# Patient Record
Sex: FEMALE | Race: WHITE | Employment: FULL TIME | URBAN - METROPOLITAN AREA
[De-identification: names, ages, dates, MRNs, and addresses within clinical notes are randomized per-mention and may not be internally consistent; named-entity substitution may affect disease eponyms.]

---

## 2018-12-25 ENCOUNTER — APPOINTMENT (OUTPATIENT)
Dept: CT IMAGING | Age: 43
End: 2018-12-25
Attending: EMERGENCY MEDICINE
Payer: SELF-PAY

## 2018-12-25 ENCOUNTER — HOSPITAL ENCOUNTER (EMERGENCY)
Age: 43
Discharge: HOME | End: 2018-12-26
Attending: EMERGENCY MEDICINE
Payer: SELF-PAY

## 2018-12-25 VITALS
WEIGHT: 215 LBS | HEIGHT: 66 IN | RESPIRATION RATE: 20 BRPM | OXYGEN SATURATION: 98 % | TEMPERATURE: 98.2 F | BODY MASS INDEX: 34.55 KG/M2 | SYSTOLIC BLOOD PRESSURE: 191 MMHG | HEART RATE: 82 BPM | DIASTOLIC BLOOD PRESSURE: 102 MMHG

## 2018-12-25 DIAGNOSIS — S00.83XA CONTUSION OF FACE, INITIAL ENCOUNTER: ICD-10-CM

## 2018-12-25 DIAGNOSIS — S02.2XXA CLOSED FRACTURE OF NASAL BONE, INITIAL ENCOUNTER: ICD-10-CM

## 2018-12-25 DIAGNOSIS — S09.90XA INJURY OF HEAD, INITIAL ENCOUNTER: Primary | ICD-10-CM

## 2018-12-25 DIAGNOSIS — I10 ESSENTIAL HYPERTENSION: ICD-10-CM

## 2018-12-25 DIAGNOSIS — Z91.14 NONCOMPLIANCE WITH MEDICATION REGIMEN: ICD-10-CM

## 2018-12-25 PROCEDURE — 70486 CT MAXILLOFACIAL W/O DYE: CPT

## 2018-12-25 PROCEDURE — 99283 EMERGENCY DEPT VISIT LOW MDM: CPT

## 2018-12-25 PROCEDURE — 70450 CT HEAD/BRAIN W/O DYE: CPT

## 2018-12-25 PROCEDURE — 74011250637 HC RX REV CODE- 250/637: Performed by: EMERGENCY MEDICINE

## 2018-12-25 RX ORDER — CLONIDINE HYDROCHLORIDE 0.1 MG/1
0.2 TABLET ORAL
Status: COMPLETED | OUTPATIENT
Start: 2018-12-25 | End: 2018-12-25

## 2018-12-25 RX ORDER — NAPROXEN 500 MG/1
500 TABLET ORAL 2 TIMES DAILY WITH MEALS
Qty: 20 TAB | Refills: 0 | Status: SHIPPED | OUTPATIENT
Start: 2018-12-25 | End: 2019-01-06

## 2018-12-25 RX ORDER — HYDROCODONE BITARTRATE AND ACETAMINOPHEN 5; 325 MG/1; MG/1
1 TABLET ORAL
Status: COMPLETED | OUTPATIENT
Start: 2018-12-25 | End: 2018-12-25

## 2018-12-25 RX ORDER — HYDROCODONE BITARTRATE AND ACETAMINOPHEN 5; 325 MG/1; MG/1
1 TABLET ORAL
Qty: 10 TAB | Refills: 0 | Status: SHIPPED | OUTPATIENT
Start: 2018-12-25 | End: 2019-01-06

## 2018-12-25 RX ADMIN — HYDROCODONE BITARTRATE AND ACETAMINOPHEN 1 TABLET: 5; 325 TABLET ORAL at 23:51

## 2018-12-25 RX ADMIN — CLONIDINE HYDROCHLORIDE 0.2 MG: 0.1 TABLET ORAL at 23:51
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
## 2018-12-26 NOTE — DISCHARGE INSTRUCTIONS
Broken Nose: Care Instructions  Your Care Instructions    A broken nose is a break, or fracture, of the bone or cartilage. Most broken noses need only home care and a follow-up visit with a doctor. The swelling should go down in a few days. Bruises around your eyes and nose should go away in 2 to 3 weeks. You heal best when you take good care of yourself. Eat a variety of healthy foods, and don't smoke. Follow-up care is a key part of your treatment and safety. Be sure to make and go to all appointments, and call your doctor if you are having problems. It's also a good idea to know your test results and keep a list of the medicines you take. How can you care for yourself at home? · If you have a nasal splint or packing, leave it in place until a doctor removes it. · If your doctor prescribed antibiotics, take them as directed. Do not stop taking them just because you feel better. You need to take the full course of antibiotics. · Take decongestants as directed to help you breathe after the splint or packing is removed. Your doctor may give you a prescription or suggest over-the-counter medicine. · Be safe with medicines. Take pain medicines exactly as directed. ? If the doctor gave you a prescription medicine for pain, take it as prescribed. ? If you are not taking a prescription pain medicine, ask your doctor if you can take an over-the-counter medicine. · Put ice or a cold pack on your nose for 10 to 20 minutes at a time. Try to do this every 1 to 2 hours for the first 3 days (when you are awake) or until the swelling goes down. Put a thin cloth between the ice pack and your skin. · Sleep with your head slightly raised until the swelling goes down. Prop up your head and shoulders on pillows. · Do not play contact sports for 6 weeks. When should you call for help? Call 911 anytime you think you may need emergency care.  For example, call if:    · You have trouble breathing.     · You passed out (lost consciousness).    Call your doctor now or seek immediate medical care if:    · You have signs of infection, such as:  ? Increased pain, swelling, warmth, or redness. ? Red streaks leading from the area. ? Pus draining from the area. ? A fever.     · You have clear fluid draining from your nose.     · You have vision changes.     · Your nose is bleeding.     · You have new or worse pain.    Watch closely for changes in your health, and be sure to contact your doctor if:    · You do not get better as expected. Where can you learn more? Go to http://nikTropical Skoopsmilly.info/. Enter Y345 in the search box to learn more about \"Broken Nose: Care Instructions. \"  Current as of: November 29, 2017  Content Version: 11.8  © 5769-8619 [employer]. Care instructions adapted under license by [employer] (which disclaims liability or warranty for this information). If you have questions about a medical condition or this instruction, always ask your healthcare professional. [place] [address] any warranty or liability for your use of this information. Head Injury: Care Instructions  Your Care Instructions    Most injuries to the head are minor. Bumps, cuts, and scrapes on the head and face usually heal well and can be treated the same as injuries to other parts of the body. Although it's rare, once in a while a more serious problem shows up after you are home. So it's good to be on the lookout for symptoms for a day or two. Follow-up care is a key part of your treatment and safety. Be sure to make and go to all appointments, and call your doctor if you are having problems. It's also a good idea to know your test results and keep a list of the medicines you take. How can you care for yourself at home? · Follow your doctor's instructions. He or she will tell you if you need someone to watch you closely for the next 24 hours or longer.   · Take it easy for the next few days or more if you are not feeling well. · Ask your doctor when it's okay for you to go back to activities like driving a car, riding a bike, or operating machinery. When should you call for help? Call 911 anytime you think you may need emergency care. For example, call if:    · You have a seizure.     · You passed out (lost consciousness).     · You are confused or can't stay awake.    Call your doctor now or seek immediate medical care if:    · You have new or worse vomiting.     · You feel less alert.     · You have new weakness or numbness in any part of your body.    Watch closely for changes in your health, and be sure to contact your doctor if:    · You do not get better as expected.     · You have new symptoms, such as headaches, trouble concentrating, or changes in mood. Where can you learn more? Go to http://nikBiometryCloudmilly.info/. Enter P701 in the search box to learn more about \"Head Injury: Care Instructions. \"  Current as of: June 4, 2018  Content Version: 11.8  © 0075-2191 [employer]. Care instructions adapted under license by [employer] (which disclaims liability or warranty for this information). If you have questions about a medical condition or this instruction, always ask your healthcare professional. [address] any warranty or liability for your use of this information. Bruised Face: Care Instructions  Your Care Instructions  You can get a bruise on your face if you fall or if something hits you in the face. The medical term for a bruise is \"contusion. \" Small blood vessels get torn and leak blood under the skin. Most people think of a bruise as a black-and-blue spot. But bones and muscles can also get bruised. This may damage deep tissues but not cause a bruise you can see. Your doctor will examine you and will gently press on your face to find areas that are tender.  He or she will check your eyes, how well you can move face muscles near the bruise, and your feeling around the area to make sure there isn't a more serious injury, such as a broken bone or nerve damage. You may have tests, including X-rays or other imaging tests like a CT scan. Bruises may cause pain and swelling. But if there is no other damage, they will usually get better in a few weeks with home treatment. Follow-up care is a key part of your treatment and safety. Be sure to make and go to all appointments, and call your doctor if you are having problems. It's also a good idea to know your test results and keep a list of the medicines you take. How can you care for yourself at home? · Put ice or a cold pack on your face for 10 to 20 minutes at a time. Put a thin cloth between the ice and your skin. Try to do this every 1 to 2 hours for the first 3 days (when you are awake) or until the swelling goes down. · Sleep with your head slightly raised until the swelling goes down. Prop up your head and shoulders on pillows. · If you play contact sports, ask your doctor when it's okay to play again. It's safest to wait at least 6 weeks. · Be safe with medicines. Read and follow all instructions on the label. ? If the doctor gave you a prescription medicine for pain, take it as prescribed. ? If you are not taking a prescription pain medicine, ask your doctor if you can take an over-the-counter medicine. When should you call for help? Call your doctor now or seek immediate medical care if:    · Your pain gets worse.     · You have new or worse swelling.     · You have new or worse bleeding.     · The area near the bruise is tingly, weak, or numb.     · You have vision changes.    Watch closely for changes in your health, and be sure to contact your doctor if:    · You do not get better as expected. Where can you learn more? Go to http://nik-milly.info/.   Enter T266 in the search box to learn more about \"Bruised Face: Care Instructions. \"  Current as of: November 20, 2017  Content Version: 11.8  © 0864-6282 [employer]. Care instructions adapted under license by [employer] (which disclaims liability or warranty for this information). If you have questions about a medical condition or this instruction, always ask your healthcare professional. [place] [address] any warranty or liability for your use of this information.
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
## 2018-12-26 NOTE — ED NOTES
Dr Vanessa Helms instructed patient to f/u with PCP concerning elevated BP. Discharge note: The patient was discharged home in stable condition, accompanied by friend. The patient is alert and oriented, is in no respiratory distress and has vital signs noted. The patient's diagnosis, condition and treatment were explained to patient by Dr Vanessa Helms. The patient party expressed understanding of discharge instructions, prescriptions, and plan of care. A discharge plan has been developed. A  was not involved in the process. Patient offered a wheelchair to ED lobby for discharge but declined at this time. Patient ambulated with a steady gate to ED lobby to go home with friend.
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
## 2018-12-26 NOTE — ED NOTES
Dr Сергей Verma made aware of elevated /108. Pt stated she has been out of her BP medication for over a week. This is a change from her triage assessment.
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
## 2019-01-06 ENCOUNTER — HOSPITAL ENCOUNTER (EMERGENCY)
Age: 44
Discharge: HOME | End: 2019-01-06
Attending: STUDENT IN AN ORGANIZED HEALTH CARE EDUCATION/TRAINING PROGRAM
Payer: SELF-PAY

## 2019-01-06 VITALS
OXYGEN SATURATION: 100 % | WEIGHT: 219.36 LBS | DIASTOLIC BLOOD PRESSURE: 91 MMHG | TEMPERATURE: 98.7 F | BODY MASS INDEX: 35.41 KG/M2 | SYSTOLIC BLOOD PRESSURE: 165 MMHG | RESPIRATION RATE: 16 BRPM | HEART RATE: 90 BPM

## 2019-01-06 DIAGNOSIS — S66.911A HAND STRAIN, RIGHT, INITIAL ENCOUNTER: ICD-10-CM

## 2019-01-06 DIAGNOSIS — L08.9 INFECTED SEBACEOUS CYST OF SKIN: Primary | ICD-10-CM

## 2019-01-06 DIAGNOSIS — L72.3 INFECTED SEBACEOUS CYST OF SKIN: Primary | ICD-10-CM

## 2019-01-06 PROCEDURE — 77030018836 HC SOL IRR NACL ICUM -A

## 2019-01-06 PROCEDURE — 74011000250 HC RX REV CODE- 250: Performed by: PHYSICIAN ASSISTANT

## 2019-01-06 PROCEDURE — 99282 EMERGENCY DEPT VISIT SF MDM: CPT

## 2019-01-06 PROCEDURE — 75810000289 HC I&D ABSCESS SIMP/COMP/MULT

## 2019-01-06 RX ORDER — DOXYCYCLINE 100 MG/1
100 CAPSULE ORAL 2 TIMES DAILY
Qty: 14 CAP | Refills: 0 | Status: SHIPPED | OUTPATIENT
Start: 2019-01-06 | End: 2019-01-13

## 2019-01-06 RX ORDER — HYDROCODONE BITARTRATE AND ACETAMINOPHEN 5; 325 MG/1; MG/1
1 TABLET ORAL
Qty: 6 TAB | Refills: 0 | Status: SHIPPED | OUTPATIENT
Start: 2019-01-06

## 2019-01-06 RX ORDER — LIDOCAINE HYDROCHLORIDE AND EPINEPHRINE 10; 10 MG/ML; UG/ML
5 INJECTION, SOLUTION INFILTRATION; PERINEURAL ONCE
Status: COMPLETED | OUTPATIENT
Start: 2019-01-06 | End: 2019-01-06

## 2019-01-06 RX ADMIN — LIDOCAINE HYDROCHLORIDE,EPINEPHRINE BITARTRATE 50 MG: 10; .01 INJECTION, SOLUTION INFILTRATION; PERINEURAL at 16:02

## 2019-01-06 NOTE — ED PROVIDER NOTES
Bryce Mejia is a 37 y.o. female with PMH of HTN presents to emergency room ambulatory with boyfriend for evaluation of painful R sebaceous cyst above R shoulder blade, with the cyst present for years, the pain and increased swelling of the cyst present for 2-3 days. No fever, vomiting, CP, SOB. States she was using a wrench with both hands yesterday trying to pry off something strong and woke up this morning with R hand pain and pain with flexion of finger; no wrist pain, weakness or tingling. She is L hand dominant, delivers newspaper for a living. Denies pregnancy chance. Tetanus UTD FH- mother has Raynaud's PCP: None Surgical hx- see chart Social hx- + tobacco 
 
The patient has no other complaints at this time. Past Medical History:  
Diagnosis Date  Hypertension History reviewed. No pertinent surgical history. History reviewed. No pertinent family history. Social History Socioeconomic History  Marital status: LEGALLY  Spouse name: Not on file  Number of children: Not on file  Years of education: Not on file  Highest education level: Not on file Social Needs  Financial resource strain: Not on file  Food insecurity - worry: Not on file  Food insecurity - inability: Not on file  Transportation needs - medical: Not on file  Transportation needs - non-medical: Not on file Occupational History  Not on file Tobacco Use  Smoking status: Current Every Day Smoker Packs/day: 0.50  Smokeless tobacco: Never Used Substance and Sexual Activity  Alcohol use: Yes Frequency: Never  Drug use: No  
 Sexual activity: Yes  
  Partners: Male Other Topics Concern  Not on file Social History Narrative  Not on file ALLERGIES: Toradol [ketorolac] and Tramadol Review of Systems Constitutional: Negative. Negative for activity change, chills, fatigue and unexpected weight change. HENT: Negative for trouble swallowing. Respiratory: Negative for cough, chest tightness, shortness of breath and wheezing. Cardiovascular: Negative. Negative for chest pain and palpitations. Gastrointestinal: Negative. Negative for abdominal pain, diarrhea, nausea and vomiting. Genitourinary: Negative. Negative for dysuria, flank pain, frequency and hematuria. Musculoskeletal: Negative. Negative for arthralgias, back pain, neck pain and neck stiffness. Skin: Positive for color change and wound. Negative for rash. Neurological: Negative. Negative for dizziness, numbness and headaches. All other systems reviewed and are negative. Vitals:  
 01/06/19 1519 01/06/19 1555 01/06/19 1556 BP: (!) 183/93 (!) 171/99 (!) 165/91 Pulse: 90 Resp: 16 Temp: 98.7 °F (37.1 °C) SpO2: 100% Weight: 99.5 kg (219 lb 5.7 oz) Physical Exam  
Constitutional: She is oriented to person, place, and time. She appears well-developed and well-nourished. She is active. Non-toxic appearance. No distress. WF  
HENT:  
Head: Normocephalic and atraumatic. Eyes: Conjunctivae are normal. Pupils are equal, round, and reactive to light. Right eye exhibits no discharge. Left eye exhibits no discharge. Neck: Normal range of motion and full passive range of motion without pain. No tracheal tenderness present. Cardiovascular: Normal rate, regular rhythm, normal heart sounds, intact distal pulses and normal pulses. Exam reveals no gallop and no friction rub. No murmur heard. Pulmonary/Chest: Effort normal and breath sounds normal. No respiratory distress. She has no wheezes. She has no rales. She exhibits no tenderness. Abdominal: Soft. Bowel sounds are normal. She exhibits no distension. There is no tenderness. There is no rebound and no guarding. Musculoskeletal: Normal range of motion. She exhibits no edema or tenderness. Strength 5/5 throughout. Pain reproducible with flexion of R fingers; no Tinel's or Phalen's sign. Capp refill brisk. Both hands cool to touch, distal pulses 2+ in both hands, skin color not pale or cyanotic. No pain unless flexing all fingers completely. Neurological: She is alert and oriented to person, place, and time. She has normal strength. No cranial nerve deficit or sensory deficit. Coordination normal.  
Skin: Skin is warm, dry and intact. Capillary refill takes less than 2 seconds. No abrasion and no rash noted. She is not diaphoretic. No erythema. Distal pulses 2+. Radial / ulnar pulses 2+. Capp refill brisk. Above R scapula with sebacous cystic structure, immobile, with pink skin edges, TTP, fluctuant center; TTP around cyst without induration. Psychiatric: She has a normal mood and affect. Her speech is normal and behavior is normal. Cognition and memory are normal.  
Nursing note and vitals reviewed. MDM Number of Diagnoses or Management Options Hand strain, right, initial encounter:  
Infected sebaceous cyst of skin:  
Diagnosis management comments:  
Ddx: sebaceous cyst, cellulitis, infected epidermal cyst 
 
  
Amount and/or Complexity of Data Reviewed Review and summarize past medical records: yes Discuss the patient with other providers: yes Patient Progress Patient progress: stable Procedures The patient was counseled on the dangers of tobacco use, and was advised to quit and reluctant to quit. Reviewed strategies to maximize success, including removing cigarettes and smoking materials from environment, stress management, substitution of other forms of reinforcement and support of family/friends. I discussed patient's PMH, exam findings as well as careplan with the ER attending who agrees with care plan. Dr. Julian Mercer also saw and examined patient. Yesy Price PA-C Procedure Note - Incision and Drainage:  
4:58 PM 
 Performed by: Sonya Escobedo PA-C Complexity: complex (probed) Skin prepped with Betadine. Sterile field established. Anesthesia achieved with 3 mLs of Lidocaine 1% with epinephrine using a local infiltration. Abscess above R scapula in soft tissue space was incised with # 11 blade, and 4mLs of thick, purulent drainage was expressed as well as multiples pieces of the wall of the sac expressed. Wound probed and irrigated. Area was packed using 1/4 inch iodoform gauze. Non-adherent, then bulky sterile dressing applied. Estimated blood loss: <1mL The procedure took 1-15 minutes, and pt tolerated well. MEDICATIONS GIVEN: 
Medications  
lidocaine-EPINEPHrine (XYLOCAINE) 1 %-1:100,000 injection 50 mg (50 mg SubCUTAneous Given by Provider 1/6/19 3344) DISCHARGE NOTE: 
4:58 PM 
The patient's results have been reviewed with them and/or available family. Patient and/or family verbally conveyed their understanding and agreement of the patient's signs, symptoms, diagnosis, treatment and prognosis and additionally agree to follow up as recommended in the discharge instructions or to return to the Emergency Room should their condition change prior to their follow-up appointment. The patient/family verbally agrees with the care-plan and verbally conveys that all of their questions have been answered. The discharge instructions have also been provided to the patient and/or family with some educational information regarding the patient's diagnosis as well a list of reasons why the patient would want to return to the ER prior to their follow-up appointment, should their condition change. Plan: 
1. F/U with PCP for wound check, remove packing in 48 hours; also with PCP for BP check 2. Rx doxycycline, norco 
3. Return precautions discussed and advised to return to ER if worse

## 2019-01-06 NOTE — ED TRIAGE NOTES
Patient ambulatory to ED treatment area with steady gait, accompanid by boyfriend, for complaint of \"I noticed a cyst on my right shoulder/back area that has been there for a awhile. Yesterday, it started to hurt when anything touches it. When I woke up this morning, my right hand was tingling and it is painful now. \" Pt reports she has had the cyst for \"awhile now. \" Pt reports \"my mom was wondering if the cyst was pushing on a nerve or causing circulation problems in my right hand. \"

## 2019-01-06 NOTE — DISCHARGE INSTRUCTIONS
Patient Education        Epidermoid Cyst: Care Instructions  Your Care Instructions  An epidermoid (say \"kr-cla-CWE-sofia\") cyst is a lump just under the skin. These cysts can form when a hair follicle becomes blocked. They are common in acne and may occur on the face, neck, back, and genitals. However, they can form anywhere on the body. These cysts are not cancer and do not lead to cancer. They tend not to hurt, but they can sometimes become swollen and painful. They also may break open (rupture) and cause scarring. These cysts sometimes do not cause problems and may not need treatment. If you have a cyst that is swollen and hurts, your doctor may inject it with a medicine to help it heal. But it is more likely that a painful cyst will need to be removed. Your doctor will give you a shot of numbing medicine and cut into the cyst to drain it or remove it. This makes the symptoms go away. Follow-up care is a key part of your treatment and safety. Be sure to make and go to all appointments, and call your doctor if you are having problems. It's also a good idea to know your test results and keep a list of the medicines you take. How can you care for yourself at home? · Do not squeeze the cyst or poke it with a needle to open it. This can cause swelling, redness, and infection. · Always have a doctor look at any new lumps you get to make sure that they are not serious. When should you call for help? Watch closely for changes in your health, and be sure to contact your doctor if:    · You have a fever, redness, or swelling after you get a shot of medicine in the cyst.     · You see or feel a new lump on your skin. Where can you learn more? Go to http://nik-milly.info/. Enter P108 in the search box to learn more about \"Epidermoid Cyst: Care Instructions. \"  Current as of: April 18, 2018  Content Version: 11.8  © 6314-3001 [employer], [employer].  Care instructions adapted under license by [employer] (which disclaims liability or warranty for this information). If you have questions about a medical condition or this instruction, always ask your healthcare professional. [address] any warranty or liability for your use of this information. Patient Education   Patient Education        Hand Pain: Care Instructions  Your Care Instructions    Common causes of hand pain are overuse and injuries, such as might happen during sports or home repair projects. Everyday wear and tear, especially as you get older, also can cause hand pain. Most minor hand injuries will heal on their own, and home treatment is usually all you need to do. If you have sudden and severe pain, you may need tests and treatment. Follow-up care is a key part of your treatment and safety. Be sure to make and go to all appointments, and call your doctor if you are having problems. It's also a good idea to know your test results and keep a list of the medicines you take. How can you care for yourself at home? · Take pain medicines exactly as directed. ? If the doctor gave you a prescription medicine for pain, take it as prescribed. ? If you are not taking a prescription pain medicine, ask your doctor if you can take an over-the-counter medicine. · Rest and protect your hand. Take a break from any activity that may cause pain. · Put ice or a cold pack on your hand for 10 to 20 minutes at a time. Put a thin cloth between the ice and your skin. · Prop up the sore hand on a pillow when you ice it or anytime you sit or lie down during the next 3 days. Try to keep it above the level of your heart. This will help reduce swelling. · If your doctor recommends a sling, splint, or elastic bandage to support your hand, wear it as directed. When should you call for help? Call 911 anytime you think you may need emergency care. For example, call if:    · Your hand turns cool or pale or changes color.  Call your doctor now or seek immediate medical care if:    · You cannot move your hand.     · Your hand pops, moves out of its normal position, and then returns to its normal position.     · You have signs of infection, such as:  ? Increased pain, swelling, warmth, or redness. ? Red streaks leading from the sore area. ? Pus draining from a place on your hand. ? A fever.     · Your hand feels numb or tingly.    Watch closely for changes in your health, and be sure to contact your doctor if:    · Your hand feels unstable when you try to use it.     · You do not get better as expected.     · You have any new symptoms, such as swelling.     · Bruises from an injury to your hand last longer than 2 weeks. Where can you learn more? Go to http://nik-milly.info/. Enter R273 in the search box to learn more about \"Hand Pain: Care Instructions. \"  Current as of: November 20, 2017  Content Version: 11.8  © 7564-3832 [employer]. Care instructions adapted under license by [employer] (which disclaims liability or warranty for this information). If you have questions about a medical condition or this instruction, always ask your healthcare professional. [address] any warranty or liability for your use of this information. Cellulitis: After Your Visit to the Emergency Room  Your Care Instructions  Cellulitis is a skin infection that can spread to other tissues in the body. It can lead to a serious infection of the blood. It is important to follow instructions for home treatment and follow-up care so that you recover completely. Even though you have been released from the emergency room, you still need to watch for any problems. The doctor carefully checked you. But sometimes problems can develop later. If you have new symptoms, or if your symptoms do not get better, return to the emergency room or call your doctor right away.   A visit to the emergency room is only one step in your treatment. Even if you feel better, you still need to do what your doctor recommends, such as going to all suggested follow-up appointments and taking medicines exactly as directed. This will help you recover and help prevent future problems. How can you care for yourself at home? · You may need antibiotics that you take through a tube in your arm (IV) at home. Your doctor will tell you how to use this medicine. · If you are taking antibiotic pills, take them as directed. Do not stop taking them just because you feel better. You need to take the full course of antibiotics. · Prop up the infected area on pillows to reduce pain and swelling. Try to keep the area above the level of your heart as much as you can. · Keep the skin clean and dry. Change your bandages as your doctor has told you. · Take pain medicines exactly as directed. ¨ If the doctor gave you a prescription medicine for pain, take it as prescribed. ¨ If you are not taking a prescription pain medicine, ask your doctor if you can take an over-the-counter medicine. · If your doctor told you to use support stockings, wear them as directed. This will help prevent swelling. · Get plenty of rest.  · Follow up with your doctor to make sure that the infection is gone. When should you call for help? Call 911 if:  · You passed out (lost consciousness). · You have other symptoms that you think are a medical emergency. Return to the emergency room now if:  · You are dizzy or lightheaded, or you feel like you may faint. · You have signs that the infection is getting worse, such as:  ¨ Increased pain, swelling, warmth, or redness. ¨ New or increasing red streaks leading from the skin infection. ¨ New or increasing pus draining from the skin infection. ¨ A new or higher fever. Call your doctor today if:  · You have problems with your medicine. Where can you learn more?    Go to DealExplorer.be  Enter T114 in the search box to learn more about \"Cellulitis: After Your Visit to the Emergency Room. \"   © 1235-5583 [employer]. Care instructions adapted under license by [facility] (which disclaims liability or warranty for this information). This care instruction is for use with your licensed healthcare professional. If you have questions about a medical condition or this instruction, always ask your healthcare professional. [address]  any warranty or liability for your use of this information. Content Version: 9.4.94586; Last Revised: October 24, 2011        [facility]     For adult and child immunizations, family planning, TB screening, STD testing and women's health services. [facility]: [place] [phone]      [facility] 25   657 [facility]   [address]   170 [facility]: [address] [phone]      [address] [place] [address]          [address] [address]     For primary care services, woman and child wellness, and some clinics providing specialty care. [facility] -- [address] [facility] [place] [address] [phone]   [address] [place] [address] [facility] 200 [facility] 3614 [facility] [phone]   [address] [facility] [address] [facility] [address] [phone]   [address] [facility] [address] [place] [address] [id number] [place] [address] [phone]   [address] [place] [address] Road  [address] [place] [address] [phone]   [facility] 81 [facility] [phone]   Jonah Challenger [facility] [address] [place] [address] [phone]   [facility]: [facility] [address] [phone], [facility] [phone]     [facility] [address] [facility] [address] [phone]   [facility] [address] [phone]   [employer]  [address] ([email or web address] [phone]